# Patient Record
Sex: FEMALE | Race: WHITE | URBAN - METROPOLITAN AREA
[De-identification: names, ages, dates, MRNs, and addresses within clinical notes are randomized per-mention and may not be internally consistent; named-entity substitution may affect disease eponyms.]

---

## 2017-08-28 ENCOUNTER — HISTORICAL (OUTPATIENT)
Dept: INFUSION THERAPY | Facility: HOSPITAL | Age: 32
End: 2017-08-28

## 2017-08-28 LAB
ABS NEUT (OLG): 1.2 X10(3)/MCL (ref 2.1–9.2)
ANISOCYTOSIS BLD QL SMEAR: 1
BASOPHILS NFR BLD MANUAL: 1 % (ref 0–2)
EOSINOPHIL NFR BLD MANUAL: 2 % (ref 0–8)
ERYTHROCYTE [DISTWIDTH] IN BLOOD BY AUTOMATED COUNT: 13.1 % (ref 11.5–17)
FERRITIN SERPL-MCNC: 701.5 NG/ML (ref 8–388)
HCT VFR BLD AUTO: 38.6 % (ref 37–47)
HGB BLD-MCNC: 12.4 GM/DL (ref 12–16)
IRON SATN MFR SERPL: 73.3 % (ref 20–50)
IRON SERPL-MCNC: 192 MCG/DL (ref 50–175)
LYMPHOCYTES NFR BLD MANUAL: 18 %
LYMPHOCYTES NFR BLD MANUAL: 24 % (ref 13–40)
MACROCYTES BLD QL SMEAR: 1
MCH RBC QN AUTO: 34.2 PG (ref 27–31)
MCHC RBC AUTO-ENTMCNC: 32.1 GM/DL (ref 33–36)
MCV RBC AUTO: 106.3 FL (ref 80–94)
MONOCYTES NFR BLD MANUAL: 4 % (ref 2–11)
NEUTROPHILS NFR BLD MANUAL: 51 % (ref 47–80)
PLATELET # BLD AUTO: 172 X10(3)/MCL (ref 130–400)
PLATELET # BLD EST: NORMAL 10*3/UL
PMV BLD AUTO: 9.5 FL (ref 7.4–10.4)
RBC # BLD AUTO: 3.63 X10(6)/MCL (ref 4.2–5.4)
TIBC SERPL-MCNC: 262 MCG/DL (ref 250–450)
TRANSFERRIN SERPL-MCNC: 191 MG/DL (ref 200–360)
WBC # SPEC AUTO: 2.5 X10(3)/MCL (ref 4.5–11.5)

## 2017-09-08 ENCOUNTER — HISTORICAL (OUTPATIENT)
Dept: INFUSION THERAPY | Facility: HOSPITAL | Age: 32
End: 2017-09-08

## 2017-09-11 ENCOUNTER — HISTORICAL (OUTPATIENT)
Dept: ADMINISTRATIVE | Facility: HOSPITAL | Age: 32
End: 2017-09-11

## 2017-09-11 LAB
ABS NEUT (OLG): 2.31 X10(3)/MCL (ref 2.1–9.2)
BASOPHILS # BLD AUTO: 0 X10(3)/MCL (ref 0–0.2)
BASOPHILS NFR BLD AUTO: 0.3 %
EOSINOPHIL # BLD AUTO: 0.1 X10(3)/MCL (ref 0–0.9)
EOSINOPHIL NFR BLD AUTO: 2.5 %
ERYTHROCYTE [DISTWIDTH] IN BLOOD BY AUTOMATED COUNT: 13.3 % (ref 11.5–17)
FOLATE SERPL-MCNC: 6.7 NG/ML (ref 3.1–17.5)
HCT VFR BLD AUTO: 38.3 % (ref 37–47)
HGB BLD-MCNC: 12.4 GM/DL (ref 12–16)
LYMPHOCYTES # BLD AUTO: 0.9 X10(3)/MCL (ref 0.6–4.6)
LYMPHOCYTES NFR BLD AUTO: 24.1 %
MCH RBC QN AUTO: 34.3 PG (ref 27–31)
MCHC RBC AUTO-ENTMCNC: 32.4 GM/DL (ref 33–36)
MCV RBC AUTO: 106.1 FL (ref 80–94)
MONOCYTES # BLD AUTO: 0.4 X10(3)/MCL (ref 0.1–1.3)
MONOCYTES NFR BLD AUTO: 9.9 %
NEUTROPHILS # BLD AUTO: 2.3 X10(3)/MCL (ref 2.1–9.2)
NEUTROPHILS NFR BLD AUTO: 63.2 %
PLATELET # BLD AUTO: 180 X10(3)/MCL (ref 130–400)
PMV BLD AUTO: 9.4 FL (ref 9.4–12.4)
RBC # BLD AUTO: 3.61 X10(6)/MCL (ref 4.2–5.4)
RET# (OHS): 0.09 X10^6/ML (ref 0.02–0.08)
RETICULOCYTE COUNT AUTOMATED (OLG): 2.5 % (ref 1.1–2.1)
VIT B12 SERPL-MCNC: 278 PG/ML (ref 193–986)
WBC # SPEC AUTO: 3.6 X10(3)/MCL (ref 4.5–11.5)

## 2017-09-25 ENCOUNTER — HISTORICAL (OUTPATIENT)
Dept: INFUSION THERAPY | Facility: HOSPITAL | Age: 32
End: 2017-09-25

## 2017-10-02 ENCOUNTER — HISTORICAL (OUTPATIENT)
Dept: ADMINISTRATIVE | Facility: HOSPITAL | Age: 32
End: 2017-10-02

## 2017-10-02 LAB
ABS NEUT (OLG): 1.69 X10(3)/MCL (ref 2.1–9.2)
BASOPHILS # BLD AUTO: 0 X10(3)/MCL (ref 0–0.2)
BASOPHILS NFR BLD AUTO: 0.7 %
EOSINOPHIL # BLD AUTO: 0.1 X10(3)/MCL (ref 0–0.9)
EOSINOPHIL NFR BLD AUTO: 2.7 %
ERYTHROCYTE [DISTWIDTH] IN BLOOD BY AUTOMATED COUNT: 13.1 % (ref 11.5–17)
HCT VFR BLD AUTO: 34.1 % (ref 37–47)
HGB BLD-MCNC: 11.1 GM/DL (ref 12–16)
LYMPHOCYTES # BLD AUTO: 1 X10(3)/MCL (ref 0.6–4.6)
LYMPHOCYTES NFR BLD AUTO: 32 %
MCH RBC QN AUTO: 34.7 PG (ref 27–31)
MCHC RBC AUTO-ENTMCNC: 32.6 GM/DL (ref 33–36)
MCV RBC AUTO: 106.6 FL (ref 80–94)
MONOCYTES # BLD AUTO: 0.2 X10(3)/MCL (ref 0.1–1.3)
MONOCYTES NFR BLD AUTO: 8.3 %
NEUTROPHILS # BLD AUTO: 1.7 X10(3)/MCL (ref 2.1–9.2)
NEUTROPHILS NFR BLD AUTO: 56.3 %
PLATELET # BLD AUTO: 311 X10(3)/MCL (ref 130–400)
PMV BLD AUTO: 9.6 FL (ref 9.4–12.4)
RBC # BLD AUTO: 3.2 X10(6)/MCL (ref 4.2–5.4)
WBC # SPEC AUTO: 3 X10(3)/MCL (ref 4.5–11.5)

## 2017-10-09 ENCOUNTER — HISTORICAL (OUTPATIENT)
Dept: INFUSION THERAPY | Facility: HOSPITAL | Age: 32
End: 2017-10-09

## 2017-10-17 ENCOUNTER — HISTORICAL (OUTPATIENT)
Dept: INFUSION THERAPY | Facility: HOSPITAL | Age: 32
End: 2017-10-17

## 2017-11-08 ENCOUNTER — HISTORICAL (OUTPATIENT)
Dept: INFUSION THERAPY | Facility: HOSPITAL | Age: 32
End: 2017-11-08

## 2017-11-13 ENCOUNTER — HISTORICAL (OUTPATIENT)
Dept: INFUSION THERAPY | Facility: HOSPITAL | Age: 32
End: 2017-11-13

## 2017-11-20 ENCOUNTER — HISTORICAL (OUTPATIENT)
Dept: INFUSION THERAPY | Facility: HOSPITAL | Age: 32
End: 2017-11-20

## 2017-11-20 LAB
ABS NEUT (OLG): 3.65 X10(3)/MCL (ref 2.1–9.2)
ACANTHOCYTES (OLG): 0
ANISOCYTOSIS BLD QL SMEAR: 0
BASOPHILS # BLD AUTO: 0 X10(3)/MCL (ref 0–0.2)
BASOPHILS NFR BLD AUTO: 0.2 %
BURR CELLS BLD QL SMEAR: 0
EOSINOPHIL # BLD AUTO: 0.2 X10(3)/MCL (ref 0–0.9)
EOSINOPHIL NFR BLD AUTO: 3.5 %
ERYTHROCYTE [DISTWIDTH] IN BLOOD BY AUTOMATED COUNT: 12.4 % (ref 11.5–17)
FERRITIN SERPL-MCNC: 108.2 NG/ML (ref 8–388)
HCT VFR BLD AUTO: 38.9 % (ref 37–47)
HGB BLD-MCNC: 12.6 GM/DL (ref 12–16)
HYPOCHROMIA BLD QL SMEAR: 0
LYMPHOCYTES # BLD AUTO: 0.8 X10(3)/MCL (ref 0.6–4.6)
LYMPHOCYTES NFR BLD AUTO: 15.2 %
MACROCYTES BLD QL SMEAR: 0
MCH RBC QN AUTO: 34.4 PG (ref 27–31)
MCHC RBC AUTO-ENTMCNC: 32.4 GM/DL (ref 33–36)
MCV RBC AUTO: 106.3 FL (ref 80–94)
MICROCYTES BLD QL SMEAR: 0
MONOCYTES # BLD AUTO: 0.6 X10(3)/MCL (ref 0.1–1.3)
MONOCYTES NFR BLD AUTO: 11.1 %
NEUTROPHILS # BLD AUTO: 3.6 X10(3)/MCL (ref 2.1–9.2)
NEUTROPHILS NFR BLD AUTO: 70 %
OVALOCYTES BLD QL SMEAR: 0
PLATELET # BLD AUTO: 198 X10(3)/MCL (ref 130–400)
PLATELET # BLD EST: NORMAL 10*3/UL
PMV BLD AUTO: 9.9 FL (ref 9.4–12.4)
POIKILOCYTOSIS BLD QL SMEAR: 0
POLYCHROMASIA BLD QL SMEAR: 0
RBC # BLD AUTO: 3.66 X10(6)/MCL (ref 4.2–5.4)
SCHISTOCYTES BLD QL AUTO: 0
SPHEROCYTES BLD QL SMEAR: 0
TARGETS BLD QL SMEAR: 0
WBC # SPEC AUTO: 5.2 X10(3)/MCL (ref 4.5–11.5)

## 2017-12-21 ENCOUNTER — HISTORICAL (OUTPATIENT)
Dept: LAB | Facility: HOSPITAL | Age: 32
End: 2017-12-21

## 2017-12-25 LAB — FINAL CULTURE: NORMAL

## 2018-05-10 ENCOUNTER — HISTORICAL (OUTPATIENT)
Dept: LAB | Facility: HOSPITAL | Age: 33
End: 2018-05-10

## 2018-05-10 ENCOUNTER — HISTORICAL (OUTPATIENT)
Dept: ADMINISTRATIVE | Facility: HOSPITAL | Age: 33
End: 2018-05-10

## 2018-05-10 LAB
ABS NEUT (OLG): 2.8
ALBUMIN SERPL-MCNC: 4.9 GM/DL (ref 3.4–5)
ALBUMIN/GLOB SERPL: 2.13 {RATIO} (ref 1.5–2.5)
ALP SERPL-CCNC: 46 UNIT/L (ref 38–126)
ALT SERPL-CCNC: 10 UNIT/L (ref 7–52)
AST SERPL-CCNC: 18 UNIT/L (ref 15–37)
BILIRUB SERPL-MCNC: 0.4 MG/DL (ref 0.2–1)
BILIRUBIN DIRECT+TOT PNL SERPL-MCNC: 0.1 MG/DL (ref 0–0.5)
BILIRUBIN DIRECT+TOT PNL SERPL-MCNC: 0.3 MG/DL
BUN SERPL-MCNC: 12 MG/DL (ref 7–18)
CALCIUM SERPL-MCNC: 8.6 MG/DL (ref 8.5–10)
CHLORIDE SERPL-SCNC: 100 MMOL/L (ref 98–107)
CO2 SERPL-SCNC: 28 MMOL/L (ref 21–32)
CREAT SERPL-MCNC: 0.67 MG/DL (ref 0.6–1.3)
ERYTHROCYTE [DISTWIDTH] IN BLOOD BY AUTOMATED COUNT: 13 % (ref 11.5–17)
FERRITIN SERPL-MCNC: 19.8 NG/ML (ref 8–388)
GLOBULIN SER-MCNC: 2.3 GM/DL (ref 1.2–3)
GLUCOSE SERPL-MCNC: 103 MG/DL (ref 74–106)
HCT VFR BLD AUTO: 41.7 % (ref 37–47)
HGB BLD-MCNC: 13.7 GM/DL (ref 12–16)
IRON SATN MFR SERPL: 15 % (ref 20–50)
IRON SERPL-MCNC: 61 MCG/DL (ref 50–175)
LYMPHOCYTES # BLD AUTO: 1.1 X10(3)/MCL (ref 0.6–3.4)
LYMPHOCYTES NFR BLD AUTO: 25.4 % (ref 13–40)
MCH RBC QN AUTO: 33.1 PG (ref 27–31.2)
MCHC RBC AUTO-ENTMCNC: 33 GM/DL (ref 32–36)
MCV RBC AUTO: 101 FL (ref 80–94)
MONOCYTES # BLD AUTO: 0.3 X10(3)/MCL (ref 0–1.8)
MONOCYTES NFR BLD AUTO: 7.7 % (ref 0.1–24)
NEUTROPHILS NFR BLD AUTO: 66.9 % (ref 47–80)
PLATELET # BLD AUTO: 264 X10(3)/MCL (ref 130–400)
PMV BLD AUTO: 8.4 FL
POTASSIUM SERPL-SCNC: 4.1 MMOL/L (ref 3.5–5.1)
PROT SERPL-MCNC: 7.2 GM/DL (ref 6.4–8.2)
RBC # BLD AUTO: 4.14 X10(6)/MCL (ref 4.2–5.4)
SODIUM SERPL-SCNC: 135 MMOL/L (ref 136–145)
TIBC SERPL-MCNC: 407 MCG/DL (ref 250–450)
TRANSFERRIN SERPL-MCNC: 321 MG/DL (ref 200–360)
TSH SERPL-ACNC: 4.95 MIU/ML (ref 0.35–4.94)
VIT B12 SERPL-MCNC: 279 PG/ML (ref 193–986)
WBC # SPEC AUTO: 4.2 X10(3)/MCL (ref 4.5–11.5)

## 2018-05-14 LAB
T3FREE SERPL-MCNC: 3.42 PG/ML (ref 1.45–3.48)
T4 FREE SERPL-MCNC: 1.12 NG/DL (ref 0.76–1.46)

## 2018-06-13 ENCOUNTER — HISTORICAL (OUTPATIENT)
Dept: ADMINISTRATIVE | Facility: HOSPITAL | Age: 33
End: 2018-06-13

## 2018-06-13 LAB — TSH SERPL-ACNC: 1.58 MIU/ML (ref 0.35–4.94)

## 2018-11-29 ENCOUNTER — HISTORICAL (OUTPATIENT)
Dept: ADMINISTRATIVE | Facility: HOSPITAL | Age: 33
End: 2018-11-29

## 2018-11-29 LAB
ABS NEUT (OLG): 3.6 X10(3)/MCL (ref 2.1–9.2)
ALBUMIN SERPL-MCNC: 5.2 GM/DL (ref 3.4–5)
ALBUMIN/GLOB SERPL: 1.62 {RATIO} (ref 1.5–2.5)
ALP SERPL-CCNC: 42 UNIT/L (ref 38–126)
ALT SERPL-CCNC: 12 UNIT/L (ref 7–52)
AST SERPL-CCNC: 19 UNIT/L (ref 15–37)
BILIRUB SERPL-MCNC: 0.6 MG/DL (ref 0.2–1)
BILIRUBIN DIRECT+TOT PNL SERPL-MCNC: 0.1 MG/DL (ref 0–0.5)
BILIRUBIN DIRECT+TOT PNL SERPL-MCNC: 0.5 MG/DL
BUN SERPL-MCNC: 7 MG/DL (ref 7–18)
CALCIUM SERPL-MCNC: 9 MG/DL (ref 8.5–10)
CHLORIDE SERPL-SCNC: 105 MMOL/L (ref 98–107)
CO2 SERPL-SCNC: 19 MMOL/L (ref 21–32)
CREAT SERPL-MCNC: 0.66 MG/DL (ref 0.6–1.3)
ERYTHROCYTE [DISTWIDTH] IN BLOOD BY AUTOMATED COUNT: 12.9 % (ref 11.5–17)
GLOBULIN SER-MCNC: 3.2 GM/DL (ref 1.2–3)
GLUCOSE SERPL-MCNC: 102 MG/DL (ref 74–106)
HCT VFR BLD AUTO: 45.4 % (ref 37–47)
HGB BLD-MCNC: 14 GM/DL (ref 12–16)
LYMPHOCYTES # BLD AUTO: 0.9 X10(3)/MCL (ref 0.6–3.4)
LYMPHOCYTES NFR BLD AUTO: 18.9 % (ref 13–40)
MCH RBC QN AUTO: 32.8 PG (ref 27–31.2)
MCHC RBC AUTO-ENTMCNC: 31 GM/DL (ref 32–36)
MCV RBC AUTO: 106 FL (ref 80–94)
MONOCYTES # BLD AUTO: 0.4 X10(3)/MCL (ref 0.1–1.3)
MONOCYTES NFR BLD AUTO: 7.5 % (ref 0.1–24)
NEUTROPHILS NFR BLD AUTO: 73.6 % (ref 47–80)
PLATELET # BLD AUTO: 293 X10(3)/MCL (ref 130–400)
PMV BLD AUTO: 8.9 FL (ref 9.4–12.4)
POTASSIUM SERPL-SCNC: 3.7 MMOL/L (ref 3.5–5.1)
PROT SERPL-MCNC: 8.4 GM/DL (ref 6.4–8.2)
RBC # BLD AUTO: 4.27 X10(6)/MCL (ref 4.2–5.4)
SODIUM SERPL-SCNC: 139 MMOL/L (ref 136–145)
TSH SERPL-ACNC: 0.87 MIU/ML (ref 0.35–4.94)
WBC # SPEC AUTO: 4.9 X10(3)/MCL (ref 4.5–11.5)

## 2018-11-30 ENCOUNTER — HISTORICAL (OUTPATIENT)
Dept: LAB | Facility: HOSPITAL | Age: 33
End: 2018-11-30

## 2018-12-03 LAB — FINAL CULTURE: NORMAL

## 2018-12-05 ENCOUNTER — HISTORICAL (OUTPATIENT)
Dept: ENDOSCOPY | Facility: HOSPITAL | Age: 33
End: 2018-12-05

## 2018-12-13 ENCOUNTER — HISTORICAL (OUTPATIENT)
Dept: ADMINISTRATIVE | Facility: HOSPITAL | Age: 33
End: 2018-12-13

## 2018-12-13 ENCOUNTER — HISTORICAL (OUTPATIENT)
Dept: LAB | Facility: HOSPITAL | Age: 33
End: 2018-12-13

## 2018-12-13 LAB
ABS NEUT (OLG): 2 X10(3)/MCL (ref 2.1–9.2)
ALBUMIN SERPL-MCNC: 4.6 GM/DL (ref 3.4–5)
ALBUMIN/GLOB SERPL: 1.59 {RATIO} (ref 1.5–2.5)
ALP SERPL-CCNC: 47 UNIT/L (ref 38–126)
ALT SERPL-CCNC: 17 UNIT/L (ref 7–52)
APPEARANCE, UA: CLEAR
AST SERPL-CCNC: 21 UNIT/L (ref 15–37)
BACTERIA #/AREA URNS AUTO: ABNORMAL /HPF
BILIRUB SERPL-MCNC: 0.2 MG/DL (ref 0.2–1)
BILIRUB UR QL STRIP: NEGATIVE MG/DL
BILIRUBIN DIRECT+TOT PNL SERPL-MCNC: 0.1 MG/DL
BILIRUBIN DIRECT+TOT PNL SERPL-MCNC: 0.1 MG/DL (ref 0–0.5)
BUN SERPL-MCNC: 10 MG/DL (ref 7–18)
CALCIUM SERPL-MCNC: 8.5 MG/DL (ref 8.5–10)
CHLORIDE SERPL-SCNC: 100 MMOL/L (ref 98–107)
CO2 SERPL-SCNC: 27 MMOL/L (ref 21–32)
COLOR UR: YELLOW
CREAT SERPL-MCNC: 0.58 MG/DL (ref 0.6–1.3)
ERYTHROCYTE [DISTWIDTH] IN BLOOD BY AUTOMATED COUNT: 12.4 % (ref 11.5–17)
FERRITIN SERPL-MCNC: 10.3 NG/ML (ref 8–388)
GLOBULIN SER-MCNC: 2.9 GM/DL (ref 1.2–3)
GLUCOSE (UA): NEGATIVE MG/DL
GLUCOSE SERPL-MCNC: 114 MG/DL (ref 74–106)
HCT VFR BLD AUTO: 41.1 % (ref 37–47)
HGB BLD-MCNC: 12.6 GM/DL (ref 12–16)
HGB UR QL STRIP: NEGATIVE UNIT/L
IRON SATN MFR SERPL: 13.4 % (ref 20–50)
IRON SERPL-MCNC: 55 MCG/DL (ref 50–175)
KETONES UR QL STRIP: NEGATIVE MG/DL
LEUKOCYTE ESTERASE UR QL STRIP: ABNORMAL UNIT/L
LYMPHOCYTES # BLD AUTO: 1 X10(3)/MCL (ref 0.6–3.4)
LYMPHOCYTES NFR BLD AUTO: 30.2 % (ref 13–40)
MCH RBC QN AUTO: 32.3 PG (ref 27–31.2)
MCHC RBC AUTO-ENTMCNC: 31 GM/DL (ref 32–36)
MCV RBC AUTO: 105 FL (ref 80–94)
MONOCYTES # BLD AUTO: 0.4 X10(3)/MCL (ref 0.1–1.3)
MONOCYTES NFR BLD AUTO: 11.3 % (ref 0.1–24)
NEUTROPHILS NFR BLD AUTO: 58.5 % (ref 47–80)
NITRITE UR QL STRIP.AUTO: NEGATIVE
PH UR STRIP: 6 [PH]
PLATELET # BLD AUTO: 181 X10(3)/MCL (ref 130–400)
PMV BLD AUTO: 9 FL (ref 9.4–12.4)
POTASSIUM SERPL-SCNC: 4.3 MMOL/L (ref 3.5–5.1)
PROT SERPL-MCNC: 7.5 GM/DL (ref 6.4–8.2)
PROT UR QL STRIP: NEGATIVE MG/DL
RBC # BLD AUTO: 3.9 X10(6)/MCL (ref 4.2–5.4)
RBC #/AREA URNS HPF: ABNORMAL /HPF
SODIUM SERPL-SCNC: 133 MMOL/L (ref 136–145)
SP GR UR STRIP: 1.02
SQUAMOUS EPITHELIAL, UA: ABNORMAL /LPF
TIBC SERPL-MCNC: 411 MCG/DL (ref 250–450)
TRANSFERRIN SERPL-MCNC: 303 MG/DL (ref 200–360)
TSH SERPL-ACNC: 4.92 MIU/ML (ref 0.35–4.94)
UROBILINOGEN UR STRIP-ACNC: 0.2 MG/DL
WBC # SPEC AUTO: 3.4 X10(3)/MCL (ref 4.5–11.5)
WBC #/AREA URNS AUTO: ABNORMAL /[HPF]

## 2022-04-10 ENCOUNTER — HISTORICAL (OUTPATIENT)
Dept: ADMINISTRATIVE | Facility: HOSPITAL | Age: 37
End: 2022-04-10

## 2022-04-28 VITALS
DIASTOLIC BLOOD PRESSURE: 98 MMHG | HEIGHT: 63 IN | WEIGHT: 147.06 LBS | OXYGEN SATURATION: 97 % | SYSTOLIC BLOOD PRESSURE: 120 MMHG | BODY MASS INDEX: 26.06 KG/M2

## 2022-05-03 NOTE — HISTORICAL [facility] CERNER
This is a historical note converted from Cerrupal. Formatting and pictures may have been removed.  Please reference Cerrupal for original formatting and attached multimedia. Chief Complaint  stomach pains, blood in stool comes and goes.  History of Present Illness  The?last month patient?has experienced?nausea, lower abdominal cramps,?diarrhea?and blood in stool. ?Patient has similar episode one year ago which?was due to C. difficile colitis. ?Patient denies any recent antibiotic use.? Denies any fever or chills.? Denies any rectal pain. ?She did start on Zoloft?recently.  Also 2-3-day history of nasal congestion, rhinorrhea, postnasal drip and cough. ?Denies any fever or chills. ?Symptoms improved today.  Review of Systems  Constitutional:?No weight loss, no fever, no fatigue, no chills, no night sweats,?no weakness  Eyes:?No blurred vision,?no redness,?no drainage,?no ocular?pain  HEENT:?No sore throat,?no ear pain, no sinus pressure, nasal congestion, rhinorrhea, postnasal drip  Respiratory:?cough, no wheezing, no sputum production, no shortness of breath  Cardiovascular:?No chest pain, no palpitations, no dyspnea on exertion,?no orthopnea  Gastrointestinal:?No nausea, no vomiting, abdominal pain, diarrhea,?no constipation, no melena,?hematochezia  Genitourinary:?No dysuria, no hematuria, no frequency, no urgency, no incontinence, no discharge  Musculoskeletal:?myalgias, arthralgias, no weakness, no joint effusion, no edema  Integumentary:?No rashes, no hives, no itching, no lesions, no jaundice  Neurologic:?No headaches, no numbness, no tingling, no weakness, no dizziness  Psychiatric:?No anxiety, no irritability, no depression,?no suicidal ideations, no?homicidal ideations,?no delusions, no hallucinations  Endocrine:?No polyuria, no polydipsia, no polyphagia  Hematology:?No bruising, no lymphadenopathy,?no paleness  ?  Physical Exam  Vitals & Measurements  T:?37.1? ?C (Oral)? HR:?80(Peripheral)?  BP:?124/86?  HT:?160?cm? HT:?160?cm? WT:?63.4?kg? WT:?63.4?kg? BMI:?24.77?  General:?Well developed, Well-nourished, in No acute distress, A&O x 4  Eye:?PERRLA, EOMI, Clear conjunctiva, Eyelids unremarkable  Ears:?Bilateral EAC clear?and?Bilateral TM clear  Nose:? Mucosa erythema, rhinorrhea, No lesions  O/P:??erythema,?No tonsillar hypertrophy,?No tonsillar exudates,?cobblestoning  Neck:?Soft, Nontender, No thyromegaly, Full range of motion, No cervical lymphadenopathy, No JVD  Cardiovascular:?Regular rate and rhythm, No murmurs, No gallops, No rubs  Respiratory:?Clear to auscultation bilaterally, No wheezes, No rhonchi, No?crackles  Abdomen:? Soft, generalized?tenderness?to palpation, No hepatosplenomegaly, Normal and equal bowel sounds, No masses, No rebound, No guarding  Musculoskeletal:? No tenderness, FROM, No joint abnormality, No clubbing, No cyanosis,No?edema  Neurologic:? No motor or sensory deficits, Reflexes 2+ throughout, CN II-XII grossly intact  Integumentary:?No rashes or skin lesions  ?  Assessment/Plan  1.?Hematochezia?K92.1  Ordered:  External Referral, Hematochezia/Diarrhea, GI, Dr. Kruger, 11/29/18 16:07:00 CST, Hematochezia  Diarrhea  Lower abdominal pain  Office/Outpatient Visit Level 4 Established [id number] PC, Hematochezia  Diarrhea  Lower abdominal pain, [facility], 11/29/18 16:05:00 CST  ?  2.?Diarrhea?R19.7  Ordered:  Clostridium Difficile by PCR, Routine collect, 11/29/18 16:05:00 CST, Stool Clostrium difficile, Order for future visit, Stop date 11/29/18 16:05:00 CST, Nurse collect, Diarrhea, 11/29/18 16:05:00 CST  Comprehensive Metabolic Panel, Routine collect, 11/29/18 16:09:00 CST, Blood, Stop date 11/29/18 16:10:00 CST, Lab Collect, Diarrhea, 11/29/18 16:09:00 CST  External Referral, Hematochezia/Diarrhea, GI, Dr. Kruger, 11/29/18 16:07:00 CST, Hematochezia  Diarrhea  Lower abdominal pain  Fecal Leukocytes - Lactoferrin on stool, Routine collect, 11/29/18 16:05:00  CST, Stool, Order for future visit, Stop date 11/29/18 16:05:00 CST, Nurse collect, Diarrhea, 11/29/18 16:05:00 CST  Office/Outpatient Visit Level 4 Established [id number] PC, Hematochezia  Diarrhea  Lower abdominal pain, [facility], 11/29/18 16:05:00 CST  Stool Culture, Routine collect, 11/29/18 16:05:00 CST, Order for future visit, Stool, Stop date 11/29/18 16:05:00 CST, Diarrhea  Thyroid Stimulating Hormone, Routine collect, 11/29/18 16:09:00 CST, Blood, Stop date 11/29/18 16:10:00 CST, Lab Collect, Diarrhea, 11/29/18 16:09:00 CST  ?  3.?Lower abdominal pain?R10.30  Hyoscyamine 0.125mg q4 prn abdominal pain  Promethazine 25mg q6 prn N/V  Ordered:  External Referral, Hematochezia/Diarrhea, GI, Dr. Kruger, 11/29/18 16:07:00 CST, Hematochezia  Diarrhea  Lower abdominal pain  Office/Outpatient Visit Level 4 Established [id number] PC, Hematochezia  Diarrhea  Lower abdominal pain, [facility], 11/29/18 16:05:00 CST  ?  4.?Acute URI?J06.9  ?Symptomatic treatment  ?  Orders:  hyoscyamine, 0.125 mg = 1 tab(s), SL, q4hr, PRN PRN abdominal cramps, # 20 tab(s), 0 Refill(s), Pharmacy: [facility]  DAVID Guzman  promethazine, 25 mg = 1 tab(s), Oral, q6hr, PRN PRN for nausea/vomiting, # 30 tab(s), 0 Refill(s), Pharmacy: [facility] - [place], [place]   Problem List/Past Medical History  Ongoing  Anxiety disorder  Chronic leg pain  May-Thurner syndrome  MAGAN (obstructive sleep apnea)  Recurrent deep vein thrombosis (DVT)  Stasis dermatitis  Historical  Decreased white blood cell count, unspecified  Procedure/Surgical History  Appendectomy  ILIAC STENTS  Tonsillectomy   Medications  Ativan 0.5 mg oral tablet, 0.5 mg= 1 tab(s), Oral, Daily, PRN  clobetasol 0.05% topical cream, 1 lino, TOP, BID, 1 refills  Eliquis 5 mg oral tablet, 5 mg= 1 tab(s), Oral, BID  hyoscyamine 0.125 mg sublingual tablet, 0.125 mg= 1 tab(s), SL, q4hr, PRN  levothyroxine 50 mcg (0.05 mg) oral tablet, 50 mcg= 1 tab(s), Oral, Daily, 5  refills  oxyCODONE 20 mg oral tablet, 20 mg= 1 tab(s), Oral, q6hr, PRN  Phenergan 25 mg Tab, 25 mg= 1 tab(s), Oral, q6hr, PRN  Toprol-XL 25 mg oral tablet, extended release, 25 mg= 1 tab(s), Oral, Daily  Vitamin D 1,000 Units Tab, 1 tab(s), Oral, Daily  Allergies  Cipro?(SOB)  Imitrex?(swelling)  Iodine USP?(SOB)  Zofran?(Metoclopramide adverse reaction, swelling)  baclofen?(throat closing)  doxycycline?(SOB)  metoclopramide?(swelling)  penicillin?(SOB, Ciprofloxacin)  Social History  Alcohol  Current, 1-2 times per week, 09/11/2017  Employment/School  Employed, Work/School description: ., 09/11/2017  Home/Environment  Lives with Father., 09/11/2017  Substance Abuse  Never, 09/11/2017  Tobacco  Never smoker, N/A, 11/29/2018  Never smoker, 08/28/2017  Family History  CKD - chronic kidney disease: Mother.  Diabetes mellitus type 1.: Mother.  MI - Myocardial infarction: Father.  Health Maintenance  Health Maintenance  ???Pending?(in the next year)  ??? ??Due?  ??? ? ? ?ADL Screening due??11/29/18??and every 1??year(s)  ??? ? ? ?Alcohol Misuse Screening due??11/29/18??and every 1??year(s)  ??? ? ? ?Cervical Cancer Screening due??11/29/18??and every?  ??? ? ? ?Influenza Vaccine due??11/29/18??and every?  ??? ? ? ?Tetanus Vaccine due??11/29/18??and every 10??year(s)  ???Satisfied?(in the past 1 year)  ??? ??Satisfied?  ??? ? ? ?Blood Pressure Screening on??11/29/18.??Satisfied by Cecily Orozco  ??? ? ? ?Body Mass Index Check on??11/29/18.??Satisfied by Cecily Orozco  ??? ? ? ?Influenza Vaccine on??11/29/18.??Satisfied by Cecily Orozco  ??? ? ? ?Obesity Screening on??11/29/18.??Satisfied by Cecily Orozco  ?  ?

## 2022-05-03 NOTE — HISTORICAL [facility] CERN[facility]
This is a historical note converted from Hira. Formatting and pictures may have been removed.  Please reference Hira for original formatting and attached multimedia. Chief Complaint  patient states not feeling well, spacey in head  History of Present Illness  , shortness of breath, palpitations, chest painPatient presents with?feeling of malaise.? Yesterday she felt?out of it?so to speak.? She had trouble focusing?and carrying on a conversation with her father.? She was in the hospital?5 days from 12/5/18 to 12/10/18?at [facility] for?left leg pain. ?Patient has a history of recurring?DVT?and chronic left leg pain?has resolved.? Previous diarrhea and hematochezia has improved. ?She did have a colonoscopy on 12/5/2018?which was unremarkable for any significant abnormality.? She does have a history of?significant anemia in the past?that required IV iron treatment.? Denies any new medications.  Review of Systems  Constitutional:?No weight loss, no fever, fatigue, no chills, no night sweats,?no weakness  Eyes:?No blurred vision,?no redness,?no drainage,?no ocular?pain  HEENT:?No sore throat,?no ear pain, no sinus pressure, no nasal congestion, no rhinorrhea, no postnasal drip  Respiratory:?No cough, no wheezing, no sputum production, no shortness of breath  Cardiovascular:?No chest pain, no palpitations, no dyspnea on exertion,?no orthopnea  Gastrointestinal:?No nausea, no vomiting, no abdominal pain, no diarrhea,?no constipation, no melena,?no hematochezia  Genitourinary:?No dysuria, no hematuria, no frequency, no urgency, no incontinence, no discharge  Musculoskeletal:?myalgias, no arthralgias, no weakness, no joint effusion, no edema  Integumentary:?No rashes, no hives, no itching, no lesions, no jaundice  Neurologic:?No headaches, no numbness, no tingling, no weakness, no dizziness  Psychiatric:?No anxiety, no irritability, no depression,?no suicidal ideations, no?homicidal ideations,?no delusions, no  hallucinations  Endocrine:?No polyuria, no polydipsia, no polyphagia  Hematology:?No bruising, no lymphadenopathy,?no paleness  ?  Physical Exam  Vitals & Measurements  HR:?60(Peripheral)? BP:?120/98? SpO2:?97%?  HT:?160?cm? HT:?160?cm? WT:?66.7?kg? WT:?66.7?kg? BMI:?26.05?  General:?Well developed, Well-nourished, in No acute distress, A&O x 4  Eye:?PERRLA, EOMI, Clear conjunctiva, Eyelids unremarkable  Ears:?Bilateral EAC clear?and?Bilateral TM clear  Nose:? Mucosa normal, No rhinorrhea, No lesions  O/P:??No?erythema,?No tonsillar hypertrophy,?No tonsillar exudates,?No cobblestoning  Neck:?Soft, Nontender, No thyromegaly, Full range of motion, No cervical lymphadenopathy, No JVD  Cardiovascular:?Regular rate and rhythm, No murmurs, No gallops, No rubs  Respiratory:?Clear to auscultation bilaterally, No wheezes, No rhonchi, No?crackles  Abdomen:? Soft, Nontender, No hepatosplenomegaly, Normal and equal bowel sounds, No masses, No rebound, No guarding  Musculoskeletal:? No tenderness, FROM, No joint abnormality, No clubbing, No cyanosis,No?edema  Neurologic:? No motor or sensory deficits, Reflexes 2+ throughout, CN II-XII grossly intact  Integumentary:?No rashes or skin lesions  ?  Assessment/Plan  1.?Cognitive changes?R41.89  ?Nonspecific  CBC, CMP, TSH, urinalysis today  Ordered:  Office/Outpatient Visit Level 4 Established [id number] PC, Cognitive changes, [facility], 12/13/18 12:08:00 CST  ?  2.?History of anemia?Z86.2  ?Iron profile, ferritin, CBC  ?   Problem List/Past Medical History  Ongoing  Anxiety disorder  Chronic leg pain  May-Thurner syndrome  MAGAN (obstructive sleep apnea)  Recurrent deep vein thrombosis (DVT)  Stasis dermatitis  Historical  Decreased white blood cell count, unspecified  Procedure/Surgical History  Biopsy Gastrointestinal (None) (12/05/2018)  Colonoscopy (None) (12/05/2018)  Appendectomy  ILIAC STENTS  Tonsillectomy   Medications  Ativan 0.5 mg oral tablet, 0.5 mg= 1 tab(s), Oral,  Daily, PRN  clobetasol 0.05% topical cream, 1 lino, TOP, BID, 1 refills  clonazePAM 1 mg oral tablet, 1 mg= 1 tab(s), Oral, BID  Eliquis 5 mg oral tablet, 5 mg= 1 tab(s), Oral, BID  hyoscyamine 0.125 mg sublingual tablet, 0.125 mg= 1 tab(s), SL, q4hr, PRN  levothyroxine 50 mcg (0.05 mg) oral tablet, 50 mcg= 1 tab(s), Oral, Daily, 5 refills  oxyCODONE 20 mg oral tablet, 20 mg= 1 tab(s), Oral, q6hr, PRN  Phenergan 25 mg Tab, 25 mg= 1 tab(s), Oral, q6hr, PRN  Toprol-XL 25 mg oral tablet, extended release, 25 mg= 1 tab(s), Oral, Daily  Vitamin D 1,000 Units Tab, 1 tab(s), Oral, Daily  Zoloft 25 mg oral capsule  Allergies  Cipro?(SOB)  Imitrex?(swelling)  Iodine USP?(SOB)  Vistaril?(hives)  Zofran?(Metoclopramide adverse reaction, swelling)  baclofen?(throat closing)  doxycycline?(SOB)  metoclopramide?(swelling)  penicillin?(SOB, Ciprofloxacin)  Social History  Alcohol  Current, 1-2 times per week, 09/11/2017  Employment/School  Employed, Work/School description: ., 09/11/2017  Home/Environment  Lives with Father., 09/11/2017  Substance Abuse  Never, 09/11/2017  Tobacco  Never smoker, No, 12/13/2018  Never smoker, N/A, 11/29/2018  Never smoker, 08/28/2017  Family History  CKD - chronic kidney disease: Mother.  Diabetes mellitus type 1.: Mother.  MI - Myocardial infarction: Father.  Health Maintenance  Health Maintenance  ???Pending?(in the next year)  ??? ??Due?  ??? ? ? ?ADL Screening due??12/13/18??and every 1??year(s)  ??? ? ? ?Alcohol Misuse Screening due??12/13/18??and every 1??year(s)  ??? ? ? ?Cervical Cancer Screening due??12/13/18??and every?  ??? ? ? ?Influenza Vaccine due??12/13/18??and every?  ??? ? ? ?Tetanus Vaccine due??12/13/18??and every 10??year(s)  ???Satisfied?(in the past 1 year)  ??? ??Satisfied?  ??? ? ? ?Blood Pressure Screening on??12/13/18.??Satisfied by Cecily Orozco LPN  ??? ? ? ?Body Mass Index Check on??12/13/18.??Satisfied by Cecily Orozco LPN  ??? ? ? ?Influenza  Vaccine on??12/13/18.??Satisfied by Cecily Orozco LPN  ??? ? ? ?Obesity Screening on??12/13/18.??Satisfied by Cecily Orozco LPN  ?  ?      HPI should read denies any new medications, shortness of breath,?dictations,?or chest pain.

## 2022-05-03 NOTE — HISTORICAL [facility] CERNER
This is a historical note converted from Hira. Formatting and pictures may have been removed.  Please reference Hira for original formatting and attached multimedia. Chief Complaint  3 mth f/u  History of Present Illness  Patient presents for follow-up.? Started on?levothyroxine 50 mcg?daily at last visit?due to subclinical hypothyroidism.? Tolerating well without any side effects.? Fatigue?improved.? History of vitamin B12 deficiency.? B12 level?borderline low.  Continue?chronic?left groin and leg pain.? Reports?improvement in edema,?mobility?and activity level.? Continues to?see physical therapy regularly. ?Patient?feels she is progressing overall.? Now able to walk her dog occasionally?travel to see a friend recently as well. ?Reports?continued?increase in collateral?development.? Continues to require oxycodone 20 mg 4 times daily.  Difficulty falling and staying asleep?over the last few months.  Review of Systems  Constitutional:?No weight loss, no fever, no fatigue, no chills, no night sweats,?no weakness  Eyes:?No blurred vision,?no redness,?no drainage,?no ocular?pain  HEENT:?No sore throat,?no ear pain, no sinus pressure, no nasal congestion, no rhinorrhea, no postnasal drip  Respiratory:?No cough, no wheezing, no sputum production, no shortness of breath  Cardiovascular:?No chest pain, no palpitations, no dyspnea on exertion,?no orthopnea  Gastrointestinal:?No nausea, no vomiting, no abdominal pain, no diarrhea,?no constipation, no melena,?no hematochezia  Genitourinary:?No dysuria, no hematuria, no frequency, no urgency, no incontinence, no discharge  Musculoskeletal:?myalgias, no arthralgias, no weakness, no joint effusion, no edema  Integumentary:?No rashes, no hives, no itching, no lesions, no jaundice  Neurologic:?No headaches, no numbness, no tingling, no weakness, no dizziness  Psychiatric:?anxiety, no irritability, no depression,?no suicidal ideations, no?homicidal ideations,?no delusions, no  hallucinations, insomnia  Endocrine:?No polyuria, no polydipsia, no polyphagia  Hematology:?No bruising, no lymphadenopathy,?no paleness  ?  Physical Exam  Vitals & Measurements  HR:?80(Peripheral)? BP:?114/86?  HT:?160.02?cm? HT:?160.02?cm? WT:?62.5?kg? WT:?62.5?kg? BMI:?24.41?  General:?Well developed, Well-nourished, in No acute distress, A&O x 4  Neck:?Soft, Nontender, No thyromegaly, Full range of motion, No cervical lymphadenopathy, No JVD  Cardiovascular:?Regular rate and rhythm, No murmurs, No gallops, No rubs  Respiratory:?Clear to auscultation bilaterally, No wheezes, No rhonchi, No?crackles  Abdomen:? Soft, Nontender, No hepatosplenomegaly, Normal and equal bowel sounds, No masses, No rebound, No guarding  Musculoskeletal:? No tenderness, FROM, No joint abnormality, No clubbing, No cyanosis,1+?edema?bilateral lower extremity  Neurologic:? No motor or sensory deficits, Reflexes 2+ throughout, CN II-XII grossly intact  Integumentary:?No rashes or skin lesions  ?  Assessment/Plan  1.?Recurrent deep vein thrombosis (DVT)  ?Continue Eliquis and?cardiology follow-up  Ordered:  Office/Outpatient Visit Level 4 Established [id number] , Recurrent deep vein thrombosis (DVT)  Chronic leg pain  Subclinical hypothyroidism  Hx of non anemic vitamin B12 deficiency, [facility], 06/13/18 10:22:00 CDT  ?  2.?Chronic leg pain  ?Discussed plan to?decrease?oxycodone dose?long-term?and hopefully de-escalate. ?Patient has failed?adjuncts for chronic pain in the past?Lyrica, gabapentin?and Cymbalta.  Unable to refer to?pain management?due to not receiving?records from pain management?[place]  Ordered:  Office/Outpatient Visit Level 4 Established [id number] PC, Recurrent deep vein thrombosis (DVT)  Chronic leg pain  Subclinical hypothyroidism  Hx of non anemic vitamin B12 deficiency, [facility], 06/13/18 10:22:00 CDT  ?  3.?Subclinical hypothyroidism  ?TSH today  Ordered:  Office/Outpatient Visit Level 4 Established [id number]  PC, Recurrent deep vein thrombosis (DVT)  Chronic leg pain  Subclinical hypothyroidism  Hx of non anemic vitamin B12 deficiency, [facility] - [facility], 06/13/18 10:22:00 CDT  ?  4.?Hx of non anemic vitamin B12 deficiency  ?B12?thousand micrograms IM today  Ordered:  Office/Outpatient Visit Level 4 Established [id number] PC, Recurrent deep vein thrombosis (DVT)  Chronic leg pain  Subclinical hypothyroidism  Hx of non anemic vitamin B12 deficiency, [facility] - [facility], 06/13/18 10:22:00 CDT  ?  5.?Insomnia  ?Discussed sleep hygiene and trial of melatonin  ?   Problem List/Past Medical History  Ongoing  Anxiety disorder  Chronic leg pain  May-Thurner syndrome  Recurrent deep vein thrombosis (DVT)  Historical  Decreased white blood cell count, unspecified  Procedure/Surgical History  Appendectomy, ILIAC STENTS, Tonsillectomy.  Medications  clonazePAM 0.5 mg oral tablet, 0.25 mg= 0.5 tab(s), Oral, Daily  Eliquis 5 mg oral tablet, 5 mg= 1 tab(s), Oral, BID  levothyroxine 50 mcg (0.05 mg) oral tablet, 50 mcg= 1 tab(s), Oral, Daily, 1 refills  oxyCODONE 20 mg oral tablet, 20 mg= 1 tab(s), Oral, q6hr, PRN  Toprol-XL 25 mg oral tablet, extended release, 25 mg= 1 tab(s), Oral, Daily  Vitamin D 1,000 Units Tab, 1 tab(s), Oral, Daily  Allergies  Cipro?(SOB)  Imitrex?(swelling)  Iodine USP?(SOB)  Zofran?(Metoclopramide adverse reaction, swelling)  doxycycline?(SOB)  metoclopramide?(swelling)  penicillin?(SOB, Ciprofloxacin)  Social History  Alcohol  Current, 1-2 times per week, 09/11/2017  Employment/School  Employed, Work/School description: ., 09/11/2017  Home/Environment  Lives with Father., 09/11/2017  Substance Abuse  Never, 09/11/2017  Tobacco  Never smoker, 08/28/2017  Family History  CKD - chronic kidney disease: Mother.  Diabetes mellitus type 1.: Mother.  MI - Myocardial infarction: Father.  Health Maintenance  Health Maintenance  ???Pending?(in the next year)  ??? ??Due?  ??? ? ? ?Alcohol Misuse Screening  due??06/13/18??and every 1??year(s)  ??? ? ? ?Cervical Cancer Screening due??06/13/18??and every 5??year(s)  ??? ? ? ?Tetanus Vaccine due??06/13/18??and every 10??year(s)  ??? ??Due In Future?  ??? ? ? ?Influenza Vaccine not due until??10/02/18??and every 1??year(s)  ??? ? ? ?Depression Screening not due until??10/02/18??and every 1??year(s)  ???Satisfied?(in the past 1 year)  ??? ??Satisfied?  ??? ? ? ?Blood Pressure Screening on??06/13/18.??Satisfied by Cecily Orozco  ??? ? ? ?Body Mass Index Check on??06/13/18.??Satisfied by Cecily Orozco  ??? ? ? ?Depression Screening on??10/02/17.??Satisfied by Natalya Aragon LPN  ??? ? ? ?Obesity Screening on??06/13/18.??Satisfied by Cecily Orozco  ?  ?

## 2022-05-03 NOTE — HISTORICAL [facility]ERNER
This is a historical note converted from Hira. Formatting and pictures may have been removed.  Please reference Hira for original formatting and attached multimedia. Chief Complaint  fatigue, achy  History of Present Illness  Patient presents for follow-up. ?Reports increased fatigue?and restless legs and arms?over the last week. ?History of?iron deficiency anemia requiring IV iron treatments previously. ?Failed to improve with oral iron in the past.? Blood pressure is noted to be elevated over the last 6?few weeks as well which is abnormal for patient.? Patient also reports occasional palpitations. ?She saw her cardiologist last week. ?Continues to have recurrent clotting despite anticoagulation bilateral lower extremity.? Associated with chronic?bilateral leg pain. ?Manageable with oxycodone 20 mg?every 6 hours.  Review of Systems  Constitutional:?No weight loss, no fever,? fatigue, no chills, no night sweats,?no weakness  Eyes:?No blurred vision,?no redness,?no drainage,?no ocular?pain  HEENT:?No sore throat,?no ear pain, no sinus pressure, no nasal congestion, no rhinorrhea, no postnasal drip  Respiratory:?No cough, no wheezing, no sputum production, no shortness of breath  Cardiovascular:?No chest pain, no palpitations, no dyspnea on exertion,?no orthopnea  Gastrointestinal:?No nausea, no vomiting, no abdominal pain, no diarrhea,?no constipation, no melena,?no hematochezia  Genitourinary:?No dysuria, no hematuria, no frequency, no urgency, no incontinence, no discharge  Musculoskeletal:? myalgias, no arthralgias, no weakness, no joint effusion, no edema  Integumentary:?No rashes, no hives, no itching, no lesions, no jaundice  Neurologic:?No headaches, no numbness, no tingling, no weakness, no dizziness  Psychiatric:?No anxiety, no irritability, no depression,?no suicidal ideations, no?homicidal ideations,?no delusions, no hallucinations  Endocrine:?No polyuria, no polydipsia, no polyphagia  Hematology:?No  bruising, no lymphadenopathy,?no paleness  ?  Physical Exam  Vitals & Measurements  HR:?80(Peripheral)? BP:?148/84?  HT:?160.02?cm? HT:?160.02?cm? WT:?60.2?kg? WT:?60.2?kg? BMI:?23.51?  General:?Well developed, Well-nourished, in No acute distress, A&O x 4  Neck:?Soft, Nontender, No thyromegaly, Full range of motion, No cervical lymphadenopathy, No JVD  Cardiovascular:?Regular rate and rhythm, No murmurs, No gallops, No rubs  Respiratory:?Clear to auscultation bilaterally, No wheezes, No rhonchi, No?crackles  Abdomen:? Soft, Nontender, No hepatosplenomegaly, Normal and equal bowel sounds, No masses, No rebound, No guarding  Musculoskeletal:? tenderness?bilateral lower extremity greatest in left thigh, FROM, No joint abnormality, No clubbing, No cyanosis,1+?edema  Neurologic:? No motor or sensory deficits, Reflexes 2+ throughout, CN II-XII grossly intact  Integumentary:?No rashes or skin lesions  ?  Assessment/Plan  1.?Fatigue  ?CBC, CMP, TSH, B12, ferritin, iron profile  Ordered:  Comprehensive Metabolic Panel, Routine collect, 05/10/18 17:01:00 CDT, Blood, Stop date 05/10/18 17:01:00 CDT, Lab Collect, Fatigue, 05/10/18 17:01:00 CDT  Office/Outpatient Visit Level 4 Established 56125 PC, Fatigue  Palpitations  Elevated BP without diagnosis of hypertension  Hx of iron deficiency anemia  Hx of non anemic vitamin B12 deficiency, INK AMB - AFP, 05/10/18 16:54:00 CDT  Thyroid Stimulating Hormone, Routine collect, 05/10/18 17:01:00 CDT, Blood, Stop date 05/10/18 17:01:00 CDT, Lab Collect, Fatigue  Palpitations, 05/10/18 17:01:00 CDT  ?  2.?Palpitations  Ordered:  Office/Outpatient Visit Level 4 Established 02711 PC, Fatigue  Palpitations  Elevated BP without diagnosis of hypertension  Hx of iron deficiency anemia  Hx of non anemic vitamin B12 deficiency, INK AMB - AFP, 05/10/18 16:54:00 CDT  Thyroid Stimulating Hormone, Routine collect, 05/10/18 17:01:00 CDT, Blood, Stop date 05/10/18 17:01:00 CDT, Lab  Collect, Fatigue  Palpitations, 05/10/18 17:01:00 CDT  ?  3.?Elevated BP without diagnosis of hypertension  ?Discussed diet and lifestyle modification  Monitor regularly  Ordered:  Office/Outpatient Visit Level 4 Established 13870 PC, Fatigue  Palpitations  Elevated BP without diagnosis of hypertension  Hx of iron deficiency anemia  Hx of non anemic vitamin B12 deficiency, [facility] AMB - AFP, 05/10/18 16:54:00 CDT  ?  4.?Hx of iron deficiency anemia  Ordered:  Ferritin, Routine collect, 05/10/18 16:54:00 CDT, Blood, Order for future visit, Stop date 05/10/18 16:54:00 CDT, Lab Collect, Hx of iron deficiency anemia, 05/10/18 16:54:00 CDT  Iron Binding Capacity Total - Iron Profile, Routine collect, 05/10/18 16:54:00 CDT, Blood, Order for future visit, Stop date 05/10/18 16:54:00 CDT, Lab Collect, Hx of iron deficiency anemia, 05/10/18 16:54:00 CDT  Office/Outpatient Visit Level 4 Established [id number] PC, Fatigue  Palpitations  Elevated BP without diagnosis of hypertension  Hx of iron deficiency anemia  Hx of non anemic vitamin B12 deficiency, INK AMB - AFP, 05/10/18 16:54:00 CDT  ?  5.?Hx of non anemic vitamin B12 deficiency  Ordered:  Office/Outpatient Visit Level 4 Established 91622 PC, Fatigue  Palpitations  Elevated BP without diagnosis of hypertension  Hx of iron deficiency anemia  Hx of non anemic vitamin B12 deficiency, INK AMB - AFP, 05/10/18 16:54:00 CDT  Vitamin B12 Level, Routine collect, 05/10/18 16:54:00 CDT, Blood, Order for future visit, Stop date 05/10/18 16:54:00 CDT, Lab Collect, Hx of non anemic vitamin B12 deficiency, 05/10/18 16:54:00 CDT  ?  6.?Recurrent deep vein thrombosis (DVT)  ?Continue cardiology follow-up  Patient currently taking Eliquis, aspirin, cilostazol  Ordered:  oxyCODONE, 20 mg = 1 tab(s), Oral, q6hr, PRN PRN pain, # 120 tab(s), 0 Refill(s), Pharmacy: [facility] [place]  ?  7.?Chronic leg pain  ?Refill oxycodone 20 mg every 6 hours as needed pain  Awaiting  pain management referral?once?records from [facility] available for?review?pain management.  Ordered:  oxyCODONE, 20 mg = 1 tab(s), Oral, q6hr, PRN PRN pain, # 120 tab(s), 0 Refill(s), Pharmacy: [facility] - [facility]  ?   Problem List/Past Medical History  Ongoing  Anxiety disorder  Chronic leg pain  May-Thurner syndrome  Recurrent deep vein thrombosis (DVT)  Historical  Decreased white blood cell count, unspecified  Procedure/Surgical History  Appendectomy, ILIAC STENTS, Tonsillectomy.  Medications  aspirin 81 mg oral tablet, 81 mg= 1 tab(s), Oral, BID  cilostazol 50 mg oral tablet, 50 mg= 1 tab(s), Oral, BID  clonazePAM 0.5 mg oral tablet, 0.25 mg= 0.5 tab(s), Oral, Daily  Eliquis 5 mg oral tablet, 5 mg= 1 tab(s), Oral, BID  oxyCODONE 20 mg oral tablet, 20 mg= 1 tab(s), Oral, q6hr, PRN  Toprol-XL 25 mg oral tablet, extended release, 25 mg= 1 tab(s), Oral, Daily  Vitamin D 1,000 Units Tab, 1 tab(s), Oral, Daily  Allergies  Cipro?(SOB)  Imitrex?(swelling)  Iodine USP?(SOB)  Zofran?(Metoclopramide adverse reaction, swelling)  doxycycline?(SOB)  metoclopramide?(swelling)  penicillin?(SOB, Ciprofloxacin)  Social History  Alcohol  Current, 1-2 times per week, 09/11/2017  Employment/School  Employed, Work/School description: ., 09/11/2017  Home/Environment  Lives with Father., 09/11/2017  Substance Abuse  Never, 09/11/2017  Tobacco  Never smoker, 08/28/2017  Family History  CKD - chronic kidney disease: Mother.  Diabetes mellitus type 1.: Mother.  MI - Myocardial infarction: Father.